# Patient Record
Sex: FEMALE | Race: WHITE | NOT HISPANIC OR LATINO | URBAN - METROPOLITAN AREA
[De-identification: names, ages, dates, MRNs, and addresses within clinical notes are randomized per-mention and may not be internally consistent; named-entity substitution may affect disease eponyms.]

---

## 2020-08-18 ENCOUNTER — OFFICE (OUTPATIENT)
Dept: URBAN - METROPOLITAN AREA CLINIC 11 | Facility: CLINIC | Age: 70
End: 2020-08-18

## 2020-08-18 VITALS
HEIGHT: 64 IN | HEART RATE: 69 BPM | DIASTOLIC BLOOD PRESSURE: 76 MMHG | WEIGHT: 117 LBS | SYSTOLIC BLOOD PRESSURE: 147 MMHG | OXYGEN SATURATION: 96 %

## 2020-08-18 DIAGNOSIS — R11.10 VOMITING, UNSPECIFIED: ICD-10-CM

## 2020-08-18 PROCEDURE — 99203 OFFICE O/P NEW LOW 30 MIN: CPT

## 2020-08-18 NOTE — SERVICEHPINOTES
Mrs. Arias is a 69 y/o WF who presents for evaluation of vomiting. Since about May she has episodes of vomiting that last about 48 hours and occur every 2-3 weeks. There is associated upset stomach, nausea, generalized myalgias, and fatigue. The vomiting is of both liquids and solids, no blood. She denies associated fever, chills, or diarrhea. She was briefly on famotidine 40mg daily with no improvement.   She also has a chronic cough for about 2 years and sinus symptoms with congestion. She has recently been evaluated by allergist, ENT, and her primary care doctor. She had a barium esophagram that was reportedly negative for reflux. COVID-19 testing in June was negative. Within the last few weeks, her ACE inhibitor was switched to an ARB and she had a CXR that showed mild pneumonia, and she was placed on a two week course of amoxicillin (last dose today). She is scheduled for repeat CXR tomorrow. She states the cough and congestion have resolved with her current treatment and change of BP medication.

## 2020-09-19 ENCOUNTER — AMBULATORY SURGICAL CENTER (OUTPATIENT)
Dept: URBAN - METROPOLITAN AREA SURGERY 2 | Facility: SURGERY | Age: 70
End: 2020-09-19

## 2020-09-19 ENCOUNTER — AMBULATORY SURGICAL CENTER (OUTPATIENT)
Dept: URBAN - METROPOLITAN AREA SURGERY 2 | Facility: SURGERY | Age: 70
End: 2020-09-19
Payer: COMMERCIAL

## 2020-09-19 ENCOUNTER — OFFICE (OUTPATIENT)
Dept: URBAN - METROPOLITAN AREA PATHOLOGY 22 | Facility: PATHOLOGY | Age: 70
End: 2020-09-19

## 2020-09-19 VITALS
SYSTOLIC BLOOD PRESSURE: 122 MMHG | DIASTOLIC BLOOD PRESSURE: 73 MMHG | HEART RATE: 72 BPM | OXYGEN SATURATION: 92 % | SYSTOLIC BLOOD PRESSURE: 149 MMHG | OXYGEN SATURATION: 96 % | WEIGHT: 119 LBS | HEART RATE: 83 BPM | DIASTOLIC BLOOD PRESSURE: 50 MMHG | RESPIRATION RATE: 20 BRPM | OXYGEN SATURATION: 92 % | RESPIRATION RATE: 16 BRPM | DIASTOLIC BLOOD PRESSURE: 50 MMHG | HEIGHT: 64 IN | DIASTOLIC BLOOD PRESSURE: 63 MMHG | DIASTOLIC BLOOD PRESSURE: 58 MMHG | HEART RATE: 82 BPM | DIASTOLIC BLOOD PRESSURE: 58 MMHG | SYSTOLIC BLOOD PRESSURE: 109 MMHG | HEART RATE: 72 BPM | DIASTOLIC BLOOD PRESSURE: 73 MMHG | DIASTOLIC BLOOD PRESSURE: 84 MMHG | HEART RATE: 79 BPM | HEART RATE: 78 BPM | OXYGEN SATURATION: 94 % | HEART RATE: 79 BPM | HEART RATE: 78 BPM | SYSTOLIC BLOOD PRESSURE: 106 MMHG | DIASTOLIC BLOOD PRESSURE: 50 MMHG | HEART RATE: 78 BPM | OXYGEN SATURATION: 96 % | RESPIRATION RATE: 20 BRPM | SYSTOLIC BLOOD PRESSURE: 121 MMHG | TEMPERATURE: 97.6 F | OXYGEN SATURATION: 96 % | OXYGEN SATURATION: 94 % | DIASTOLIC BLOOD PRESSURE: 63 MMHG | SYSTOLIC BLOOD PRESSURE: 106 MMHG | OXYGEN SATURATION: 98 % | SYSTOLIC BLOOD PRESSURE: 149 MMHG | TEMPERATURE: 97.6 F | SYSTOLIC BLOOD PRESSURE: 122 MMHG | OXYGEN SATURATION: 98 % | HEART RATE: 83 BPM | RESPIRATION RATE: 16 BRPM | OXYGEN SATURATION: 94 % | SYSTOLIC BLOOD PRESSURE: 109 MMHG | HEART RATE: 82 BPM | WEIGHT: 119 LBS | SYSTOLIC BLOOD PRESSURE: 121 MMHG | HEART RATE: 79 BPM | SYSTOLIC BLOOD PRESSURE: 109 MMHG | HEART RATE: 82 BPM | SYSTOLIC BLOOD PRESSURE: 121 MMHG | WEIGHT: 119 LBS | OXYGEN SATURATION: 98 % | TEMPERATURE: 95.6 F | HEART RATE: 72 BPM | SYSTOLIC BLOOD PRESSURE: 149 MMHG | HEIGHT: 64 IN | OXYGEN SATURATION: 92 % | SYSTOLIC BLOOD PRESSURE: 122 MMHG | TEMPERATURE: 95.6 F | TEMPERATURE: 95.6 F | DIASTOLIC BLOOD PRESSURE: 84 MMHG | DIASTOLIC BLOOD PRESSURE: 84 MMHG | HEIGHT: 64 IN | HEART RATE: 83 BPM | DIASTOLIC BLOOD PRESSURE: 58 MMHG | SYSTOLIC BLOOD PRESSURE: 106 MMHG | DIASTOLIC BLOOD PRESSURE: 73 MMHG | DIASTOLIC BLOOD PRESSURE: 63 MMHG | RESPIRATION RATE: 16 BRPM | TEMPERATURE: 97.6 F | RESPIRATION RATE: 20 BRPM

## 2020-09-19 DIAGNOSIS — K31.7 POLYP OF STOMACH AND DUODENUM: ICD-10-CM

## 2020-09-19 DIAGNOSIS — K29.50 UNSPECIFIED CHRONIC GASTRITIS WITHOUT BLEEDING: ICD-10-CM

## 2020-09-19 DIAGNOSIS — R11.10 VOMITING, UNSPECIFIED: ICD-10-CM

## 2020-09-19 PROCEDURE — 43239 EGD BIOPSY SINGLE/MULTIPLE: CPT

## 2020-09-19 PROCEDURE — 88342 IMHCHEM/IMCYTCHM 1ST ANTB: CPT

## 2020-09-19 PROCEDURE — 88305 TISSUE EXAM BY PATHOLOGIST: CPT

## 2020-09-19 PROCEDURE — 88313 SPECIAL STAINS GROUP 2: CPT

## 2020-09-19 PROCEDURE — G8918 PT W/O PREOP ORDER IV AB PRO: HCPCS

## 2020-09-19 PROCEDURE — G8907 PT DOC NO EVENTS ON DISCHARG: HCPCS

## 2020-09-19 NOTE — SERVICEHPINOTES
Mrs. Arias is a 71 y/o WF who presents for evaluation of vomiting. Since about May she has episodes of vomiting that last about 48 hours and occur every 2-3 weeks. There is associated upset stomach, nausea, generalized myalgias, and fatigue. The vomiting is of both liquids and solids, no blood. She denies associated fever, chills, or diarrhea. She was briefly on famotidine 40mg daily with no improvement.BR**She has had no recurrent episodes of vomiting since being seen in clinic on 8/18. She also has a chronic cough for about 2 years and sinus symptoms with congestion. She has recently been evaluated by allergist, ENT, and her primary care doctor. She had a barium esophagram that was reportedly negative for reflux. COVID-19 testing in June was negative. Within the last few weeks, her ACE inhibitor was switched to an ARB and she had a CXR that showed mild pneumonia, and she was placed on a two week course of amoxicillin last month.  She states the cough and congestion have resolved with her current treatment and change of BP medication.